# Patient Record
Sex: FEMALE | Race: WHITE
[De-identification: names, ages, dates, MRNs, and addresses within clinical notes are randomized per-mention and may not be internally consistent; named-entity substitution may affect disease eponyms.]

---

## 2017-06-26 ENCOUNTER — HOSPITAL ENCOUNTER (EMERGENCY)
Dept: HOSPITAL 62 - ER | Age: 22
Discharge: HOME | End: 2017-06-26
Payer: COMMERCIAL

## 2017-06-26 VITALS — SYSTOLIC BLOOD PRESSURE: 129 MMHG | DIASTOLIC BLOOD PRESSURE: 72 MMHG

## 2017-06-26 DIAGNOSIS — Z79.899: ICD-10-CM

## 2017-06-26 DIAGNOSIS — R56.9: Primary | ICD-10-CM

## 2017-06-26 LAB
ALBUMIN SERPL-MCNC: 4.4 G/DL (ref 3.5–5)
ALP SERPL-CCNC: 84 U/L (ref 38–126)
ALT SERPL-CCNC: 25 U/L (ref 9–52)
ANION GAP SERPL CALC-SCNC: 14 MMOL/L (ref 5–19)
APPEARANCE UR: CLEAR
AST SERPL-CCNC: 20 U/L (ref 14–36)
BARBITURATES UR QL SCN: NEGATIVE
BASOPHILS # BLD AUTO: 0 10^3/UL (ref 0–0.2)
BASOPHILS NFR BLD AUTO: 0.4 % (ref 0–2)
BILIRUB DIRECT SERPL-MCNC: 0.3 MG/DL (ref 0–0.4)
BILIRUB SERPL-MCNC: 0.4 MG/DL (ref 0.2–1.3)
BILIRUB UR QL STRIP: NEGATIVE
BUN SERPL-MCNC: 11 MG/DL (ref 7–20)
CALCIUM: 9.6 MG/DL (ref 8.4–10.2)
CHLORIDE SERPL-SCNC: 103 MMOL/L (ref 98–107)
CO2 SERPL-SCNC: 23 MMOL/L (ref 22–30)
CREAT SERPL-MCNC: 0.73 MG/DL (ref 0.52–1.25)
EOSINOPHIL # BLD AUTO: 0 10^3/UL (ref 0–0.6)
EOSINOPHIL NFR BLD AUTO: 0 % (ref 0–6)
ERYTHROCYTE [DISTWIDTH] IN BLOOD BY AUTOMATED COUNT: 13 % (ref 11.5–14)
ETHANOL SERPL-MCNC: < 10 MG/DL
GLUCOSE SERPL-MCNC: 83 MG/DL (ref 75–110)
GLUCOSE UR STRIP-MCNC: NEGATIVE MG/DL
HCT VFR BLD CALC: 41.8 % (ref 36–47)
HGB BLD-MCNC: 13.8 G/DL (ref 12–15.5)
HGB HCT DIFFERENCE: -0.4
KETONES UR STRIP-MCNC: NEGATIVE MG/DL
LYMPHOCYTES # BLD AUTO: 2.5 10^3/UL (ref 0.5–4.7)
LYMPHOCYTES NFR BLD AUTO: 30.2 % (ref 13–45)
MAGNESIUM SERPL-MCNC: 2.2 MG/DL (ref 1.6–2.3)
MCH RBC QN AUTO: 29.2 PG (ref 27–33.4)
MCHC RBC AUTO-ENTMCNC: 32.9 G/DL (ref 32–36)
MCV RBC AUTO: 89 FL (ref 80–97)
METHADONE UR QL SCN: NEGATIVE
MONOCYTES # BLD AUTO: 0.8 10^3/UL (ref 0.1–1.4)
MONOCYTES NFR BLD AUTO: 9 % (ref 3–13)
NEUTROPHILS # BLD AUTO: 5.1 10^3/UL (ref 1.7–8.2)
NEUTS SEG NFR BLD AUTO: 60.4 % (ref 42–78)
NITRITE UR QL STRIP: NEGATIVE
PCP UR QL SCN: NEGATIVE
PH UR STRIP: 8 [PH] (ref 5–9)
POTASSIUM SERPL-SCNC: 4.1 MMOL/L (ref 3.6–5)
PROT SERPL-MCNC: 8.2 G/DL (ref 6.3–8.2)
PROT UR STRIP-MCNC: NEGATIVE MG/DL
RBC # BLD AUTO: 4.73 10^6/UL (ref 3.72–5.28)
SODIUM SERPL-SCNC: 139.6 MMOL/L (ref 137–145)
SP GR UR STRIP: 1
URINE OPIATES LOW: NEGATIVE
UROBILINOGEN UR-MCNC: NEGATIVE MG/DL (ref ?–2)
WBC # BLD AUTO: 8.4 10^3/UL (ref 4–10.5)

## 2017-06-26 PROCEDURE — 80307 DRUG TEST PRSMV CHEM ANLYZR: CPT

## 2017-06-26 PROCEDURE — 80175 DRUG SCREEN QUAN LAMOTRIGINE: CPT

## 2017-06-26 PROCEDURE — 85025 COMPLETE CBC W/AUTO DIFF WBC: CPT

## 2017-06-26 PROCEDURE — 83735 ASSAY OF MAGNESIUM: CPT

## 2017-06-26 PROCEDURE — 81001 URINALYSIS AUTO W/SCOPE: CPT

## 2017-06-26 PROCEDURE — 99284 EMERGENCY DEPT VISIT MOD MDM: CPT

## 2017-06-26 PROCEDURE — 36415 COLL VENOUS BLD VENIPUNCTURE: CPT

## 2017-06-26 PROCEDURE — 80053 COMPREHEN METABOLIC PANEL: CPT

## 2017-06-26 PROCEDURE — 84703 CHORIONIC GONADOTROPIN ASSAY: CPT

## 2017-06-26 NOTE — ER DOCUMENT REPORT
ED Seizure





- General


Mode of Arrival: Ambulatory


Information source: Patient





- HPI


Patient complains to provider of: History of seizures


Current seizure medications: Lamictal


Preceding symptoms/context: Changed meds or dosage


Character of seizure: Focal shaking


Associated Symptoms: Other - see notes above





<YAYA GRIGSBY - Last Filed: 06/26/17 21:59>





<CARMINECARTER ANAHI - Last Filed: 06/26/17 23:40>





- General


Chief Complaint: Probable Seizure


Stated Complaint: POSSIBLE SEIZURE


Time Seen by Provider: 06/26/17 18:59


Notes: 


22 year old female with history of seizures presents to the ED complaining of 

having a seizure earlier this evening. Patient reports that she had a large 

coffee this morning before work because she was tired and another large coffee 

while at work. On the way home patient developed a headache and laid down in 

bed.  Patient's boyfriend reports that he went to the room to say hello and the 

patient suddenly started twitching and reports that her torso was convulsing.  

The boyfriend reports that the patient's hands are not shaking during this 

episode.  The patient states that the last thing she remembers was her 

boyfriend coming into the room and saying hello and does not remove the 

episode.  Patient reports that her Lamictal was prescribed by her psychiatrist 

3 weeks ago and has been increasing the dose by one pill every week for the 

past 3 weeks.  She reports that 3 days ago she went from taking 2 pills to 3 

pills.  She reports to feeling "normal" the 2 days following this change.  

Patient has a referral to a neurologist, but has not scheduled an appointment 

because her medication appears to be working. (YAYA GRIGSBY)





- Related Data


Allergies/Adverse Reactions: 


 





Penicillins Allergy (Verified 06/23/16 09:46)


 











Past Medical History





- General


Information source: Patient





- Social History


Smoking Status: Never Smoker


Chew tobacco use (# tins/day): No


Frequency of alcohol use: Rare


Drug Abuse: None


Family History: None


Patient has suicidal ideation: No


Patient has homicidal ideation: No


Neurological Medical History: Reports: Hx Seizures


Renal/ Medical History: Denies: Hx Peritoneal Dialysis





<YAYA GRIGSBY - Last Filed: 06/26/17 21:59>





Review of Systems





- Review of Systems


Constitutional: No symptoms reported


EENT: No symptoms reported


Cardiovascular: No symptoms reported


Respiratory: No symptoms reported


Gastrointestinal: No symptoms reported


Genitourinary: No symptoms reported


Female Genitourinary: No symptoms reported


Musculoskeletal: No symptoms reported


Skin: No symptoms reported


Hematologic/Lymphatic: No symptoms reported


Neurological/Psychological: See HPI, Seizure


-: Yes All other systems reviewed and negative





<YAYA GRIGSBY - Last Filed: 06/26/17 21:59>





Physical Exam





- Vital signs


Interpretation: Normal





- General


General appearance: Appears well, Alert





- HEENT


Head: Normocephalic, Atraumatic


Eyes: Normal


Pupils: PERRL





- Respiratory


Respiratory status: No respiratory distress


Chest status: Nontender


Breath sounds: Normal


Chest palpation: Normal





- Cardiovascular


Rhythm: Regular


Heart sounds: Normal auscultation


Murmur: No





- Abdominal


Inspection: Normal


Distension: No distension


Bowel sounds: Normal


Tenderness: Nontender


Organomegaly: No organomegaly





- Back


Back: Normal, Nontender





- Extremities


General upper extremity: Normal inspection, Nontender, Normal color, Normal ROM

, Normal temperature


General lower extremity: Normal inspection, Nontender, Normal color, Normal ROM

, Normal temperature, Normal weight bearing.  No: Kisha's sign





- Neurological


Neuro grossly intact: Yes


Cognition: Normal


Orientation: AAOx4


[place] Coma Scale Eye Opening: Spontaneous


[place] Coma Scale Verbal: Oriented


[place] Coma Scale Motor: Obeys Commands


[place] Coma Scale Total: 15


Speech: Normal


Motor strength normal: LUE, RUE, LLE, RLE


Sensory: Normal





- Psychological


Associated symptoms: Normal affect, Normal mood





- Skin


Skin Temperature: Warm


Skin Moisture: Dry


Skin Color: Normal





<CARTER LOU - Last Filed: 06/26/17 23:40>





- Vital signs


Vitals: 


 











Resp


 


 12 


 


 06/26/17 18:57














Course





- Laboratory


Result Diagrams: 


 06/26/17 19:15





 06/26/17 19:57





<YAYA GRIGSBY - Last Filed: 06/26/17 21:59>





- Laboratory


Result Diagrams: 


 06/26/17 19:15





 06/26/17 19:57





<CARTER LOU - Last Filed: 06/26/17 23:40>





- Re-evaluation


Re-evalutation: 





06/26/17 23:00


Patient with no further seizure activity in the emergency department.  Patient 

just started taking an increased dose of Lamictal 2 days ago.  She is 

instructed to go back to the lower dose.  No acute findings on blood work.  No 

evidence today for imaging.  Patient is scheduled to follow-up with the 

neurologist.  Stable for discharge.  She can call for the results of the 

Lamictal level.  Agrees with plan.  Stable for discharge.


 (CARTER LOU)





- Vital Signs


Vital signs: 


 











Temp Pulse Resp BP Pulse Ox


 


 99.0 F   93   18   129/72 H  100 


 


 06/26/17 23:08  06/26/17 23:08  06/26/17 23:08  06/26/17 23:08  06/26/17 23:08














Discharge





<YAYA GRIGSBY - Last Filed: 06/26/17 21:59>





<CARTER LOU - Last Filed: 06/26/17 23:40>





- Discharge


Clinical Impression: 


 Seizure





Condition: Stable


Disposition: HOME, SELF-CARE


Instructions:  Seizure, Known Epileptic ([facility])


Additional Instructions: 


Please go back to taking 2 pills of the Lamictal for now.


You can call for results at [phone].


Please see a neurologist as soon as you are able.


Forms:  Return to Work


Referrals: 


ANASTACIO DILLON MD [ACTIVE STAFF] - Follow up as needed


Scribe Attestation: 





06/26/17 23:40


I personally performed the services described in the documentation, reviewed 

and edited the documentation which was dictated to the scribe in my presence, 

and it accurately records my words and actions. (CARTER LOU)





Scribe Documentation





- Scribe


Written by Joycee:: Ayesha Mckeon, 06/26/2017 2212


acting as scribe for :: Carmine





<YAYA GRIGSBY - Last Filed: 06/26/17 21:59>

## 2017-07-19 ENCOUNTER — HOSPITAL ENCOUNTER (OUTPATIENT)
Dept: HOSPITAL 62 - RAD | Age: 22
End: 2017-07-19
Attending: PSYCHIATRY & NEUROLOGY
Payer: COMMERCIAL

## 2017-07-19 DIAGNOSIS — R56.9: Primary | ICD-10-CM

## 2017-07-19 PROCEDURE — A9577 INJ MULTIHANCE: HCPCS

## 2017-07-19 PROCEDURE — 70553 MRI BRAIN STEM W/O & W/DYE: CPT

## 2017-07-19 NOTE — RADIOLOGY REPORT (SQ)
EXAM DESCRIPTION:  MRI HEAD COMBO



COMPLETED DATE/TIME:  7/19/2017 5:24 pm



REASON FOR STUDY:  SEIZURES R56.9  UNSPECIFIED CONVULSIONS



COMPARISON:  None.



TECHNIQUE:  Multiplanar imaging includes noncontrasted T1, T2, FLAIR, diffusion with ADC map and post
gadolinium contrast T1 sequences. Images stored on PACS.



CONTRAST TYPE AND DOSE:  20 mL Multihance.



RENAL FUNCTION:  None required. The patient is less than 50 years old.



LIMITATIONS:  None.



FINDINGS:  ANATOMY: No anomalies. Normal vascular flow voids. Pituitary fossa normal.

CSF SPACES: Normal in size and contour. No hemorrhage.

CEREBRUM: Sulci and gyri normal in size and contour. Normal white matter signal on FLAIR imaging. No 
evidence of hemorrhage, mass, or extraaxial fluid collection. No abnormal enhancement post contrast.

POSTERIOR FOSSA: No signal alteration. No hemorrhage. No edema, masses, or mass effect. Internal vivek
tory canals, cerebellopontine angles, mastoids normal. No enhancing lesions. No abnormal enhancement 
post contrast.

DIFFUSION IMAGING: Negative for acute or subacute infarction.

ORBITS: No masses. Globes normal.

PARANASAL SINUSES: No fluid levels. Mucosa normal.

OTHER: No other significant finding.



IMPRESSION:  NORMAL MRI OF THE BRAIN WITHOUT AND WITH INTRAVENOUS GADOLINIUM CONTRAST.

EVIDENCE OF ACUTE STROKE: NO.



TECHNICAL DOCUMENTATION:  JOB ID:  [id number]

 2011 [employer]- All Rights Reserved

## 2018-05-23 ENCOUNTER — HOSPITAL ENCOUNTER (EMERGENCY)
Dept: HOSPITAL 62 - ER | Age: 23
Discharge: HOME | End: 2018-05-23
Payer: COMMERCIAL

## 2018-05-23 VITALS — SYSTOLIC BLOOD PRESSURE: 128 MMHG | DIASTOLIC BLOOD PRESSURE: 88 MMHG

## 2018-05-23 DIAGNOSIS — Z87.891: ICD-10-CM

## 2018-05-23 DIAGNOSIS — B96.89: ICD-10-CM

## 2018-05-23 DIAGNOSIS — N76.0: Primary | ICD-10-CM

## 2018-05-23 DIAGNOSIS — R10.30: ICD-10-CM

## 2018-05-23 DIAGNOSIS — R35.0: ICD-10-CM

## 2018-05-23 LAB
ADD MANUAL DIFF: NO
ANION GAP SERPL CALC-SCNC: 8 MMOL/L (ref 5–19)
APPEARANCE UR: (no result)
APTT PPP: YELLOW S
BACTERIA (WET MOUNT): (no result)
BASOPHILS # BLD AUTO: 0.1 10^3/UL (ref 0–0.2)
BASOPHILS NFR BLD AUTO: 0.8 % (ref 0–2)
BILIRUB UR QL STRIP: NEGATIVE
BUN SERPL-MCNC: 17 MG/DL (ref 7–20)
CALCIUM: 8.8 MG/DL (ref 8.4–10.2)
CHLORIDE SERPL-SCNC: 106 MMOL/L (ref 98–107)
CO2 SERPL-SCNC: 28 MMOL/L (ref 22–30)
EOSINOPHIL # BLD AUTO: 0 10^3/UL (ref 0–0.6)
EOSINOPHIL NFR BLD AUTO: 0.1 % (ref 0–6)
EPITHELIALS (WET MOUNT): (no result)
ERYTHROCYTE [DISTWIDTH] IN BLOOD BY AUTOMATED COUNT: 13.7 % (ref 11.5–14)
GLUCOSE SERPL-MCNC: 87 MG/DL (ref 75–110)
GLUCOSE UR STRIP-MCNC: NEGATIVE MG/DL
HCT VFR BLD CALC: 39.9 % (ref 36–47)
HGB BLD-MCNC: 13.4 G/DL (ref 12–15.5)
KETONES UR STRIP-MCNC: NEGATIVE MG/DL
LYMPHOCYTES # BLD AUTO: 3.3 10^3/UL (ref 0.5–4.7)
LYMPHOCYTES NFR BLD AUTO: 38.9 % (ref 13–45)
MCH RBC QN AUTO: 29.5 PG (ref 27–33.4)
MCHC RBC AUTO-ENTMCNC: 33.5 G/DL (ref 32–36)
MCV RBC AUTO: 88 FL (ref 80–97)
MONOCYTES # BLD AUTO: 0.9 10^3/UL (ref 0.1–1.4)
MONOCYTES NFR BLD AUTO: 10.2 % (ref 3–13)
NEUTROPHILS # BLD AUTO: 4.2 10^3/UL (ref 1.7–8.2)
NEUTS SEG NFR BLD AUTO: 50 % (ref 42–78)
NITRITE UR QL STRIP: NEGATIVE
PH UR STRIP: 5 [PH] (ref 5–9)
PLATELET # BLD: 374 10^3/UL (ref 150–450)
POTASSIUM SERPL-SCNC: 3.9 MMOL/L (ref 3.6–5)
PROT UR STRIP-MCNC: NEGATIVE MG/DL
RBC # BLD AUTO: 4.55 10^6/UL (ref 3.72–5.28)
SODIUM SERPL-SCNC: 142.1 MMOL/L (ref 137–145)
SP GR UR STRIP: 1.03
T.VAGINALIS (WET MOUNT): (no result)
TOTAL CELLS COUNTED % (AUTO): 100 %
UROBILINOGEN UR-MCNC: NEGATIVE MG/DL (ref ?–2)
WBC # BLD AUTO: 8.4 10^3/UL (ref 4–10.5)
WBCS (WET MOUNT): (no result)
YEAST (WET MOUNT): (no result)

## 2018-05-23 PROCEDURE — 87210 SMEAR WET MOUNT SALINE/INK: CPT

## 2018-05-23 PROCEDURE — 85025 COMPLETE CBC W/AUTO DIFF WBC: CPT

## 2018-05-23 PROCEDURE — 80048 BASIC METABOLIC PNL TOTAL CA: CPT

## 2018-05-23 PROCEDURE — 81025 URINE PREGNANCY TEST: CPT

## 2018-05-23 PROCEDURE — 96376 TX/PRO/DX INJ SAME DRUG ADON: CPT

## 2018-05-23 PROCEDURE — 36415 COLL VENOUS BLD VENIPUNCTURE: CPT

## 2018-05-23 PROCEDURE — 99284 EMERGENCY DEPT VISIT MOD MDM: CPT

## 2018-05-23 PROCEDURE — 76830 TRANSVAGINAL US NON-OB: CPT

## 2018-05-23 PROCEDURE — 81001 URINALYSIS AUTO W/SCOPE: CPT

## 2018-05-23 PROCEDURE — 96375 TX/PRO/DX INJ NEW DRUG ADDON: CPT

## 2018-05-23 PROCEDURE — 96365 THER/PROPH/DIAG IV INF INIT: CPT

## 2018-05-23 NOTE — ER DOCUMENT REPORT
ED Medical Screen (RME)





- General


Chief Complaint: Abdominal Pain


Stated Complaint: VAGINAL BLEEDING/CRAMPING


Time Seen by Provider: 05/23/18 18:05


Notes: 





RAPID MEDICAL EVALUATION DISCLOSURE


I have seen this patient as part of a Rapid Medical Evaluation and, if 

applicable, placed any initially appropriate orders. The patient will be seen 

and fully evaluated, including a full history and physical exam, by a provider (

in Main ED or Fast Track) when a room becomes available.





------------------------------------------------------------------





23-year-old female here with complaints of lower abdominal pain that started 

yesterday evening.  Pain is worse with slouching and improves with stretching 

out her stomach.  She has also had some urinary frequency but no dysuria fevers 

chills vomiting nausea diarrhea.  Two weeks ago, she did have some vaginal 

bleeding that lasted about a week but this resolved.





EXAM


Minimal right lower quadrant TTP


Moderate suprapubic TTP


TRAVEL OUTSIDE OF THE [place] IN LAST 30 DAYS: No





- Related Data


Allergies/Adverse Reactions: 


 





Penicillins Allergy (Verified 05/23/18 17:36)


 











Past Medical History





- Social History


Chew tobacco use (# tins/day): No


Frequency of alcohol use: None


Drug Abuse: None


Neurological Medical History: Reports: Hx Seizures


Renal/ Medical History: Denies: Hx Peritoneal Dialysis





Physical Exam





- Vital signs


Vitals: 





 











Temp Pulse Resp BP Pulse Ox


 


 99.3 F   95   18   134/71 H  100 


 


 05/23/18 17:45  05/23/18 17:45  05/23/18 17:45  05/23/18 17:45  05/23/18 17:45














Course





- Vital Signs


Vital signs: 





 











Temp Pulse Resp BP Pulse Ox


 


 99.3 F   95   18   134/71 H  100 


 


 05/23/18 17:45  05/23/18 17:45  05/23/18 17:45  05/23/18 17:45  05/23/18 17:45

## 2018-05-23 NOTE — ER DOCUMENT REPORT
ED General





- General


Chief Complaint: Abdominal Pain


Stated Complaint: VAGINAL BLEEDING/CRAMPING


Time Seen by Provider: 05/23/18 18:05


Mode of Arrival: Ambulatory


Information source: Patient


TRAVEL OUTSIDE OF THE [place] IN LAST 30 DAYS: No





- HPI


Notes: 





23-year-old female presents emergency department for evaluation of vaginal 

bleeding and lower abdominal cramping.  Patient reports that she had bleeding 

approximately a week ago but has since resolved.  She reports that she has 

small amount of vaginal discharge.  She reports that she has urinary frequency 

but no dysuria.  Patient reports that she is on birth control at this time.  

Patient reports that she has no concern for STD.  Patient reports when she lays 

backwards her pain feels better.  She denies any fever, rash, chest pain, 

shortness of breath, abdominal pain, nausea, vomiting, diarrhea, or dysuria.





- Related Data


Allergies/Adverse Reactions: 


 





Penicillins Allergy (Verified 05/23/18 17:36)


 











Past Medical History





- General


Information source: Patient





- Social History


Smoking Status: Former Smoker


Chew tobacco use (# tins/day): No


Frequency of alcohol use: None


Drug Abuse: None


Family History: None


Patient has suicidal ideation: No


Patient has homicidal ideation: No


Neurological Medical History: Reports: Hx Seizures


Renal/ Medical History: Denies: Hx Peritoneal Dialysis





Review of Systems





- Review of Systems


-: Yes All other systems reviewed and negative





Physical Exam





- Vital signs


Vitals: 


 











Temp Pulse Resp BP Pulse Ox


 


 99.3 F   95   18   134/71 H  100 


 


 05/23/18 17:45  05/23/18 17:45  05/23/18 17:45  05/23/18 17:45  05/23/18 17:45














- Notes


Notes: 





PHYSICAL EXAMINATION:





GENERAL: Well-appearing, well-nourished and in no acute distress.





HEAD: Atraumatic, normocephalic.





ABDOMEN: Soft, moderate suprapubic tenderness with no guarding, rigidity, 

rebound tenderness, or peritoneal signs. nondistended abdomen. No masses 

appreciated.





Female : No external lesions noted.  No blood or tissue in the canal.  

Moderate white discharge.  No adnexal tenderness.  No cervical motion 

tenderness.





Musculoskeletal: Normal range of motion, no pitting or edema.  No cyanosis.





NEUROLOGICAL: Cranial nerves grossly intact.  Normal speech, normal gait.  

Normal sensory, motor exams





PSYCH: Normal mood, normal affect.





SKIN: Warm, Dry, normal turgor, no rashes or lesions noted.











Course





- Re-evaluation


Re-evalutation: 





05/23/18 21:46


Patient presented to the emergency department for evaluation of lower abdominal 

pain and vaginal bleeding.  Patient was nontoxic or septic appearing in no 

acute or respiratory distress.  Patient was afebrile not hypoxic.  Consistent 

with evidence of PID, appendicitis, cholecystitis, pancreatitis, or acute 

surgical abdomen.  See wet prep.  Ultrasound was negative for acute process.  

The likelihood of other entities in the differential is insufficient to justify 

any further testing for them.  I discussed care plan at length with patient.  

Any and all questions were answered.  Patient was given Toradol and IV fluid.  

On reassessment abdomen was soft and nontender.  Discharged home with naproxen 

and Flagyl.  Advised patient to follow-up with OB/GYN and take medications as 

instructed.  I also advised her to return immediately to the emergency 

department for any new, worsening, or concerning symptoms as discussed.  She 

understands and agrees with plan.





- Vital Signs


Vital signs: 


 











Temp Pulse Resp BP Pulse Ox


 


 99.3 F   95   18   134/71 H  100 


 


 05/23/18 17:45  05/23/18 17:45  05/23/18 17:45  05/23/18 17:45  05/23/18 17:45














- Laboratory


Result Diagrams: 


 05/23/18 20:00





 05/23/18 20:00


Laboratory results interpreted by me: 


 











  05/23/18





  18:13


 


Urine Ascorbic Acid  40 H














Discharge





- Discharge


Clinical Impression: 


 Bacterial vaginosis





Instructions:  Vaginosis, Bacterial ([facility])


Additional Instructions: 


Follow-up with OB/GYN and take medications as instructed.  Return immediately 

to the emergency department for any new, worsening, or concerning symptoms as 

discussed.


Prescriptions: 


Metronidazole [Flagyl 500 mg Tablet] 500 mg PO BID #14 tablet


Naproxen 500 mg PO Q12H #20 tablet


Referrals: 


CARRIE DIMAS MD [ACTIVE STAFF] - Follow up as needed

## 2018-05-23 NOTE — RADIOLOGY REPORT (SQ)
EXAM DESCRIPTION:  U/S NON-OB PELVIS TV W/O DOP



COMPLETED DATE/TIME:  5/23/2018 8:56 pm



REASON FOR STUDY:  Vaginal bleeding



COMPARISON:  None.



TECHNIQUE:  Dynamic and static grayscale images acquired of the pelvis via transvaginal approach and 
recorded on PACS. Additional selected color Doppler and spectral images recorded.



LIMITATIONS:  None.



FINDINGS:  UTERUS: Contour normal.  No mass.

ENDOMETRIAL STRIPE: No focal or generalized thickening. No masses.

CERVIX: No nabothian cysts.

RIGHT OVARY AND DOPPLER: Ovary not visualized.

LEFT OVARY AND DOPPLER: Ovary not visualized.

FREE FLUID: None noted.

OTHER: No other significant finding.

MEASUREMENTS:

UTERUS: 2.5 x 4.3 x 6.4 cm.

ENDOMETRIAL STRIPE: 2 mm.

RIGHT OVARY: Not visualized.

LEFT OVARY: Not visualized.



IMPRESSION:  OVARIES NOT VISUALIZED.  OTHERWISE UNREMARKABLE TRANSVAGINAL PELVIC ULTRASOUND.  NO ABNO
RMAL FINDINGS IN THE UTERUS.



TECHNICAL DOCUMENTATION:  JOB ID:  [id number]

 2011 [employer]- All Rights Reserved                          Rev-5/18



Reading location - IP/workstation name: [email or web address]

## 2019-06-14 ENCOUNTER — HOSPITAL ENCOUNTER (OUTPATIENT)
Dept: HOSPITAL 62 - OD | Age: 24
End: 2019-06-14
Attending: OBSTETRICS & GYNECOLOGY
Payer: COMMERCIAL

## 2019-06-14 DIAGNOSIS — N97.9: Primary | ICD-10-CM

## 2019-06-14 PROCEDURE — 36415 COLL VENOUS BLD VENIPUNCTURE: CPT

## 2019-06-14 PROCEDURE — 84144 ASSAY OF PROGESTERONE: CPT

## 2019-06-14 PROCEDURE — 84146 ASSAY OF PROLACTIN: CPT

## 2019-07-02 ENCOUNTER — HOSPITAL ENCOUNTER (OUTPATIENT)
Dept: HOSPITAL 62 - RAD | Age: 24
End: 2019-07-02
Attending: OBSTETRICS & GYNECOLOGY
Payer: COMMERCIAL

## 2019-07-02 DIAGNOSIS — N97.9: Primary | ICD-10-CM

## 2019-07-02 PROCEDURE — 74740 X-RAY FEMALE GENITAL TRACT: CPT

## 2019-07-02 PROCEDURE — 58340 CATHETER FOR HYSTEROGRAPHY: CPT

## 2019-07-02 NOTE — RADIOLOGY REPORT (SQ)
EXAM DESCRIPTION:  HYSTEROSALPINGOGRAM; HYSTERO CATH/INJECTION



COMPLETED DATE/TIME:  7/2/2019 4:01 pm



REASON FOR STUDY:  (N97.9)FEMALE INFERTILITY, UNSPECIFIED; FEMALE INFERTITLITY N97.9  FEMALE INFERTIL
ITY, UNSPECIFIED



COMPARISON:  Pelvic ultrasound 5/23/2018



PROCEDURE:  PRE-PROCEDURE: Procedure was explained to the patient. She was told to expect cramping du
ring the procedure, and possible spotting post procedure.

PROCEDURE: The cervix was prepped in sterile fashion.  Under direct visual inspection, the cervix was
 cannulated with the hysterosalpingogram catheter and contrast injected.



TECHNIQUE:  Temporal fluoroscopic images acquired during the procedure stored to PACS.



FLUOROSCOPY TIME:  Less than 5 seconds

22 digital radiographic images saved to PACS.



LIMITATIONS:  None.



FINDINGS:  UTERUS: No identified anomalies.  No synechia.  Normal size and configuration in the endom
etrial canal.

RIGHT ADNEXA: Normal size fallopian tube.  Free spill of contrast into the peritoneal cavity.

LEFT ADNEXA: Normal size fallopian tube.  However, there was no free spillage of contrast around the 
left ovary.  It is difficult to discern whether this is due to adhesions along the left ovary, or pre
ferential flow of contrast out the right fallopian tube.

POST PROCEDURE: The patient tolerated the procedure with no adverse effects.



IMPRESSION:  Normal opacification of the endometrial canal and right fallopian tube, with free spilla
ge out the right fallopian tube.

Normal size left fallopian tube.  However, there was no free spillage around the left ovary.

It is difficult to discern from the study whether this is due to adhesions from endometriosis along t
he distal left fallopian tube, or preferential flow out the right fallopian tube.



COMMENT:  Study performed by and interpreted by the radiologist.

Study performed by OB/GYN physician.  Supervision and interpretation by the radiologist.

Quality :  Final reports for procedures using fluoroscopy that document radiation exposure rani
rehana, or exposure time and number of fluorographic images (if radiation exposure indices are not avail
able)



TECHNICAL DOCUMENTATION:  JOB ID:  [id number]

 2011 [employer]- All Rights Reserved



Reading location - IP/workstation name: [email or web address]

## 2019-07-02 NOTE — RADIOLOGY REPORT (SQ)
EXAM DESCRIPTION:  HYSTEROSALPINGOGRAM; HYSTERO CATH/INJECTION



COMPLETED DATE/TIME:  7/2/2019 4:01 pm



REASON FOR STUDY:  (N97.9)FEMALE INFERTILITY, UNSPECIFIED; FEMALE INFERTITLITY N97.9  FEMALE INFERTIL
ITY, UNSPECIFIED



COMPARISON:  Pelvic ultrasound 5/23/2018



PROCEDURE:  PRE-PROCEDURE: Procedure was explained to the patient. She was told to expect cramping du
ring the procedure, and possible spotting post procedure.

PROCEDURE: The cervix was prepped in sterile fashion.  Under direct visual inspection, the cervix was
 cannulated with the hysterosalpingogram catheter and contrast injected.



TECHNIQUE:  Temporal fluoroscopic images acquired during the procedure stored to PACS.



FLUOROSCOPY TIME:  Less than 5 seconds

22 digital radiographic images saved to PACS.



LIMITATIONS:  None.



FINDINGS:  UTERUS: No identified anomalies.  No synechia.  Normal size and configuration in the endom
etrial canal.

RIGHT ADNEXA: Normal size fallopian tube.  Free spill of contrast into the peritoneal cavity.

LEFT ADNEXA: Normal size fallopian tube.  However, there was no free spillage of contrast around the 
left ovary.  It is difficult to discern whether this is due to adhesions along the left ovary, or pre
ferential flow of contrast out the right fallopian tube.

POST PROCEDURE: The patient tolerated the procedure with no adverse effects.



IMPRESSION:  Normal opacification of the endometrial canal and right fallopian tube, with free spilla
ge out the right fallopian tube.

Normal size left fallopian tube.  However, there was no free spillage around the left ovary.

It is difficult to discern from the study whether this is due to adhesions from endometriosis along t
he distal left fallopian tube, or preferential flow out the right fallopian tube.



COMMENT:  Study performed by and interpreted by the radiologist.

Study performed by OB/GYN physician.  Supervision and interpretation by the radiologist.

Quality :  Final reports for procedures using fluoroscopy that document radiation exposure rani
rehana, or exposure time and number of fluorographic images (if radiation exposure indices are not avail
able)



TECHNICAL DOCUMENTATION:  JOB ID:  [id number]

 2011 [employer]- All Rights Reserved



Reading location - IP/workstation name: [email or web address]

## 2019-08-18 ENCOUNTER — HOSPITAL ENCOUNTER (EMERGENCY)
Dept: HOSPITAL 62 - ER | Age: 24
Discharge: HOME | End: 2019-08-18
Payer: COMMERCIAL

## 2019-08-18 VITALS — SYSTOLIC BLOOD PRESSURE: 125 MMHG | DIASTOLIC BLOOD PRESSURE: 62 MMHG

## 2019-08-18 DIAGNOSIS — R10.2: Primary | ICD-10-CM

## 2019-08-18 LAB
ADD MANUAL DIFF: NO
ANION GAP SERPL CALC-SCNC: 10 MMOL/L (ref 5–19)
APPEARANCE UR: (no result)
APTT PPP: YELLOW S
BASOPHILS # BLD AUTO: 0 10^3/UL (ref 0–0.2)
BASOPHILS NFR BLD AUTO: 0.3 % (ref 0–2)
BILIRUB UR QL STRIP: NEGATIVE
BUN SERPL-MCNC: 13 MG/DL (ref 7–20)
CALCIUM: 9.5 MG/DL (ref 8.4–10.2)
CHLORIDE SERPL-SCNC: 102 MMOL/L (ref 98–107)
CO2 SERPL-SCNC: 27 MMOL/L (ref 22–30)
EOSINOPHIL # BLD AUTO: 0 10^3/UL (ref 0–0.6)
EOSINOPHIL NFR BLD AUTO: 0.1 % (ref 0–6)
ERYTHROCYTE [DISTWIDTH] IN BLOOD BY AUTOMATED COUNT: 13.3 % (ref 11.5–14)
GLUCOSE SERPL-MCNC: 86 MG/DL (ref 75–110)
GLUCOSE UR STRIP-MCNC: NEGATIVE MG/DL
HCT VFR BLD CALC: 42.8 % (ref 36–47)
HGB BLD-MCNC: 14.6 G/DL (ref 12–15.5)
KETONES UR STRIP-MCNC: NEGATIVE MG/DL
LYMPHOCYTES # BLD AUTO: 2.7 10^3/UL (ref 0.5–4.7)
LYMPHOCYTES NFR BLD AUTO: 33.9 % (ref 13–45)
MCH RBC QN AUTO: 30.4 PG (ref 27–33.4)
MCHC RBC AUTO-ENTMCNC: 34.1 G/DL (ref 32–36)
MCV RBC AUTO: 89 FL (ref 80–97)
MONOCYTES # BLD AUTO: 0.7 10^3/UL (ref 0.1–1.4)
MONOCYTES NFR BLD AUTO: 9.1 % (ref 3–13)
NEUTROPHILS # BLD AUTO: 4.5 10^3/UL (ref 1.7–8.2)
NEUTS SEG NFR BLD AUTO: 56.6 % (ref 42–78)
NITRITE UR QL STRIP: NEGATIVE
PH UR STRIP: 6 [PH] (ref 5–9)
PLATELET # BLD: 383 10^3/UL (ref 150–450)
POTASSIUM SERPL-SCNC: 4.3 MMOL/L (ref 3.6–5)
PROT UR STRIP-MCNC: NEGATIVE MG/DL
RBC # BLD AUTO: 4.8 10^6/UL (ref 3.72–5.28)
SP GR UR STRIP: 1.02
TOTAL CELLS COUNTED % (AUTO): 100 %
UROBILINOGEN UR-MCNC: NEGATIVE MG/DL (ref ?–2)
WBC # BLD AUTO: 7.9 10^3/UL (ref 4–10.5)

## 2019-08-18 PROCEDURE — 76830 TRANSVAGINAL US NON-OB: CPT

## 2019-08-18 PROCEDURE — 93976 VASCULAR STUDY: CPT

## 2019-08-18 PROCEDURE — 81025 URINE PREGNANCY TEST: CPT

## 2019-08-18 PROCEDURE — 36415 COLL VENOUS BLD VENIPUNCTURE: CPT

## 2019-08-18 PROCEDURE — 96374 THER/PROPH/DIAG INJ IV PUSH: CPT

## 2019-08-18 PROCEDURE — 80048 BASIC METABOLIC PNL TOTAL CA: CPT

## 2019-08-18 PROCEDURE — 81001 URINALYSIS AUTO W/SCOPE: CPT

## 2019-08-18 PROCEDURE — 99284 EMERGENCY DEPT VISIT MOD MDM: CPT

## 2019-08-18 PROCEDURE — 85025 COMPLETE CBC W/AUTO DIFF WBC: CPT

## 2019-08-18 NOTE — ER DOCUMENT REPORT
ED General





- General


Chief Complaint: Pelvic Pain


Stated Complaint: PELVIC PAIN


Time Seen by Provider: 08/18/19 18:01


Primary Care Provider: 


NICK ANTHONY MD [Primary Care Provider] - Follow up in 3-5 days


Notes: 





Patient is a 24-year-old female with history of endometriosis that presents to 

the emergency department for chief complaint of pelvic pain.  Patient states she

is been having on and off pelvic pain for the past 2 weeks, is worse on the left

compared to the right, seem to be worse today to the point that it caught her 

off guard, she states it is worse with applying pressure to the area, and its a 

burning sensation that occasionally sharp.  She has had increased urination, but

denies having any dysuria or hematuria.  She denies having any flank pain, 

fevers, chills, night sweats, nausea, vomiting, chest pain or shortness of 

breath.  Denies having any headaches.  Denies any vaginal bleeding or discharge.

Patient states that her current pain is around a 4/10. 





Past Medical History: Endometriosis, epilepsy, anxiety


Past Surgical History: Denies surgical history


Social History: Admits to rare alcohol use, denies tobacco or illicit drug use.


Family History: Reviewed and noncontributory for presenting illness


Allergies: Reviewed, see documented allergy list. 





REVIEW OF SYSTEMS:


Other than noted above, the 12 point review of systems was reviewed with the 

patient and were negative, all pertinent findings are included in the HPI.





PHYSICAL EXAMINATION:





Vital signs reviewed, nursing noted reviewed. 





GENERAL: Well-appearing, well-nourished and in no acute distress.





HEAD: Atraumatic, normocephalic.





EYES: Eyes appear normal, extraocular movements intact, sclera anicteric, 

conjunctiva are normal.





ENT: nares patent, oropharynx clear without exudates.  Moist mucous membranes.





NECK: Normal range of motion, supple without lymphadenopathy





LUNGS: Breath sounds clear to auscultation bilaterally and equal.  No wheezes 

rales or rhonchi.





HEART: Regular rate and rhythm without murmurs





ABDOMEN: Soft, mild tenderness to palpation to the left lower abdomen, 

normoactive bowel sounds.  No rebound, guarding, or rigidity. No masses 

appreciated.





EXTREMITIES: Nontender, good range of motion, no pitting or edema.  





NEUROLOGICAL: No focal neurological deficits. Moves all extremities spontane

ously Motor and sensory grossly intact on exam.





PSYCH: Normal mood, normal affect.





SKIN: Warm, Dry, normal turgor, no rashes or lesions noted on exposed skin





TRAVEL OUTSIDE OF THE [place] IN LAST 30 DAYS: No





- Related Data


Allergies/Adverse Reactions: 


                                        





Penicillins Allergy (Verified 08/18/19 17:02)


   











Past Medical History





- Social History


Smoking Status: Never Smoker


Family History: None


Patient has suicidal ideation: No


Patient has homicidal ideation: No


Neurological Medical History: Reports: Hx Seizures


Renal/ Medical History: Denies: Hx Peritoneal Dialysis





Physical Exam





- Vital signs


Vitals: 


                                        











Temp Pulse Resp BP Pulse Ox


 


 99.1 F   95   18   149/94 H  100 


 


 08/18/19 17:07  08/18/19 17:07  08/18/19 17:07  08/18/19 17:07  08/18/19 17:07














Course





- Re-evaluation


Re-evalutation: 





Patient seen and examined, vital signs reviewed, patient appeared well on exam, 

did have some mild lower abdominal tenderness, but was otherwise unremarkable.  

Patient blood work was obtained, as well as transvaginal ultrasound and 

urinalysis, pregnancy testing, all of which were unremarkable, no signs of to

rsion on ultrasound, cyst, or other concerning findings.  Patient was treated 

with Toradol for her pain, patient is feeling improved afterwards, discussed 

with her that this may be endometriosis, and advised to follow-up with her 

OB/GYN, which she states she will follow-up with.  She is advised to take 

over-the-counter NSAIDs, and if her symptoms worsen to return to the emergency 

department.  Patient was agreeable with this plan of care and discharged home.








Laboratory











  08/18/19 08/18/19 08/18/19





  18:07 18:21 18:21


 


WBC   7.9 


 


RBC   4.80 


 


Hgb   14.6 


 


Hct   42.8 


 


MCV   89 


 


MCH   30.4 


 


MCHC   34.1 


 


RDW   13.3 


 


Plt Count   383 


 


Seg Neutrophils %   56.6 


 


Lymphocytes %   33.9 


 


Monocytes %   9.1 


 


Eosinophils %   0.1 


 


Basophils %   0.3 


 


Absolute Neutrophils   4.5 


 


Absolute Lymphocytes   2.7 


 


Absolute Monocytes   0.7 


 


Absolute Eosinophils   0.0 


 


Absolute Basophils   0.0 


 


Sodium    138.6


 


Potassium    4.3


 


Chloride    102


 


Carbon Dioxide    27


 


Anion Gap    10


 


BUN    13


 


Creatinine    0.69


 


Est GFR ( Amer)    > 60


 


Est GFR (Non-Af Amer)    > 60


 


Glucose    86


 


Calcium    9.5


 


Urine Color  YELLOW  


 


Urine Appearance  SLIGHTLY-CLOUDY  


 


Urine pH  6.0  


 


Ur Specific Gravity  1.021  


 


Urine Protein  NEGATIVE  


 


Urine Glucose (UA)  NEGATIVE  


 


Urine Ketones  NEGATIVE  


 


Urine Blood  NEGATIVE  


 


Urine Nitrite  NEGATIVE  


 


Urine Bilirubin  NEGATIVE  


 


Urine Urobilinogen  NEGATIVE  


 


Ur Leukocyte Esterase  NEGATIVE  


 


Urine WBC (Auto)  1  


 


Urine RBC (Auto)  2  


 


Urine Bacteria (Auto)  TRACE  


 


Squamous Epi Cells Auto  3  


 


Urine Mucus (Auto)  OCC  


 


Urine Ascorbic Acid  NEGATIVE  


 


Urine HCG, Qual  NEGATIVE  











                                        





Transvaginal US  08/18/19 18:10


IMPRESSION:  Nonvisualized ovaries.  Age-appropriate uterus.


 














- Vital Signs


Vital signs: 


                                        











Temp Pulse Resp BP Pulse Ox


 


 99.1 F   88   18   125/62   100 


 


 08/18/19 17:07  08/18/19 20:03  08/18/19 20:03  08/18/19 20:03  08/18/19 20:03














- Laboratory


Result Diagrams: 


                                 08/18/19 18:21





                                 08/18/19 18:21





Discharge





- Discharge


Clinical Impression: 


 Pelvic pain





Condition: Stable


Disposition: HOME, SELF-CARE


Instructions:  Pelvic Pain ([facility])


Additional Instructions: 


Please follow-up with your OB/GYN, if you have any worsening of your symptoms, 

the pain gets any worse, you develop severe vomiting, please return to the 

emergency department to be reevaluated.


Referrals: 


NICK ANTHONY MD [Primary Care Provider] - Follow up in 3-5 days

## 2019-08-18 NOTE — RADIOLOGY REPORT (SQ)
EXAM DESCRIPTION:  U/S NON OB PEL TV W/DOPPLER



COMPLETED DATE/TIME:  8/18/2019 6:47 pm



REASON FOR STUDY:  left pelvic pain



COMPARISON:  None.



TECHNIQUE:  Dynamic and static grayscale images acquired of the pelvis via transvaginal approach and 
recorded on PACS. Additional selected color Doppler and spectral images recorded.



LIMITATIONS:  None.



FINDINGS:  UTERUS: Contour normal.  No mass.

ENDOMETRIAL STRIPE: No focal or generalized thickening. No masses.

CERVIX: No nabothian cysts.

RIGHT OVARY AND DOPPLER: Ovary not visualized.

LEFT OVARY AND DOPPLER: Ovary not visualized.

FREE FLUID: None noted.

OTHER: No other significant finding.

MEASUREMENTS:

UTERUS: 7.4 x 4.5 x 3.0 cm

ENDOMETRIAL STRIPE: 4.5 mm

RIGHT OVARY: Not visualized.

LEFT OVARY: Not visualized.



IMPRESSION:  Nonvisualized ovaries.  Age-appropriate uterus.



TECHNICAL DOCUMENTATION:  JOB ID:  [id number]

[id number]

 2011 [email or web address]- All Rights Reserved                          Rev-[id number]



Reading location - IP/workstation name: [email or web address]

## 2019-09-24 ENCOUNTER — HOSPITAL ENCOUNTER (EMERGENCY)
Dept: HOSPITAL 62 - ER | Age: 24
Discharge: HOME | End: 2019-09-24
Payer: COMMERCIAL

## 2019-09-24 VITALS — SYSTOLIC BLOOD PRESSURE: 124 MMHG | DIASTOLIC BLOOD PRESSURE: 71 MMHG

## 2019-09-24 DIAGNOSIS — O99.351: ICD-10-CM

## 2019-09-24 DIAGNOSIS — G40.909: ICD-10-CM

## 2019-09-24 DIAGNOSIS — O26.891: Primary | ICD-10-CM

## 2019-09-24 DIAGNOSIS — R11.0: ICD-10-CM

## 2019-09-24 DIAGNOSIS — R10.2: ICD-10-CM

## 2019-09-24 DIAGNOSIS — Z79.899: ICD-10-CM

## 2019-09-24 DIAGNOSIS — Z88.0: ICD-10-CM

## 2019-09-24 DIAGNOSIS — R10.32: ICD-10-CM

## 2019-09-24 DIAGNOSIS — Z3A.01: ICD-10-CM

## 2019-09-24 LAB
ADD MANUAL DIFF: NO
ALBUMIN SERPL-MCNC: 4.4 G/DL (ref 3.5–5)
ALP SERPL-CCNC: 70 U/L (ref 38–126)
ANION GAP SERPL CALC-SCNC: 11 MMOL/L (ref 5–19)
APPEARANCE UR: (no result)
APTT PPP: YELLOW S
AST SERPL-CCNC: 37 U/L (ref 14–36)
BASOPHILS # BLD AUTO: 0.1 10^3/UL (ref 0–0.2)
BASOPHILS NFR BLD AUTO: 1.3 % (ref 0–2)
BILIRUB DIRECT SERPL-MCNC: 0.3 MG/DL (ref 0–0.4)
BILIRUB SERPL-MCNC: 0.5 MG/DL (ref 0.2–1.3)
BILIRUB UR QL STRIP: NEGATIVE
BUN SERPL-MCNC: 11 MG/DL (ref 7–20)
CALCIUM: 9.1 MG/DL (ref 8.4–10.2)
CHLORIDE SERPL-SCNC: 105 MMOL/L (ref 98–107)
CO2 SERPL-SCNC: 23 MMOL/L (ref 22–30)
EOSINOPHIL # BLD AUTO: 0 10^3/UL (ref 0–0.6)
EOSINOPHIL NFR BLD AUTO: 0.2 % (ref 0–6)
ERYTHROCYTE [DISTWIDTH] IN BLOOD BY AUTOMATED COUNT: 13.3 % (ref 11.5–14)
GLUCOSE SERPL-MCNC: 86 MG/DL (ref 75–110)
GLUCOSE UR STRIP-MCNC: NEGATIVE MG/DL
HCT VFR BLD CALC: 40.3 % (ref 36–47)
HGB BLD-MCNC: 13.6 G/DL (ref 12–15.5)
KETONES UR STRIP-MCNC: NEGATIVE MG/DL
LYMPHOCYTES # BLD AUTO: 3.5 10^3/UL (ref 0.5–4.7)
LYMPHOCYTES NFR BLD AUTO: 43.1 % (ref 13–45)
MCH RBC QN AUTO: 30.5 PG (ref 27–33.4)
MCHC RBC AUTO-ENTMCNC: 33.7 G/DL (ref 32–36)
MCV RBC AUTO: 91 FL (ref 80–97)
MONOCYTES # BLD AUTO: 0.8 10^3/UL (ref 0.1–1.4)
MONOCYTES NFR BLD AUTO: 10.5 % (ref 3–13)
NEUTROPHILS # BLD AUTO: 3.7 10^3/UL (ref 1.7–8.2)
NEUTS SEG NFR BLD AUTO: 44.9 % (ref 42–78)
NITRITE UR QL STRIP: NEGATIVE
PH UR STRIP: 6 [PH] (ref 5–9)
PLATELET # BLD: 342 10^3/UL (ref 150–450)
POTASSIUM SERPL-SCNC: 3.7 MMOL/L (ref 3.6–5)
PROT SERPL-MCNC: 7.9 G/DL (ref 6.3–8.2)
PROT UR STRIP-MCNC: 30 MG/DL
RBC # BLD AUTO: 4.44 10^6/UL (ref 3.72–5.28)
SP GR UR STRIP: 1.03
TOTAL CELLS COUNTED % (AUTO): 100 %
UROBILINOGEN UR-MCNC: NEGATIVE MG/DL (ref ?–2)
WBC # BLD AUTO: 8.1 10^3/UL (ref 4–10.5)

## 2019-09-24 PROCEDURE — 84702 CHORIONIC GONADOTROPIN TEST: CPT

## 2019-09-24 PROCEDURE — 99284 EMERGENCY DEPT VISIT MOD MDM: CPT

## 2019-09-24 PROCEDURE — 85025 COMPLETE CBC W/AUTO DIFF WBC: CPT

## 2019-09-24 PROCEDURE — 76817 TRANSVAGINAL US OBSTETRIC: CPT

## 2019-09-24 PROCEDURE — 96374 THER/PROPH/DIAG INJ IV PUSH: CPT

## 2019-09-24 PROCEDURE — 81025 URINE PREGNANCY TEST: CPT

## 2019-09-24 PROCEDURE — 81001 URINALYSIS AUTO W/SCOPE: CPT

## 2019-09-24 PROCEDURE — 87086 URINE CULTURE/COLONY COUNT: CPT

## 2019-09-24 PROCEDURE — 80053 COMPREHEN METABOLIC PANEL: CPT

## 2019-09-24 PROCEDURE — 36415 COLL VENOUS BLD VENIPUNCTURE: CPT

## 2019-09-24 PROCEDURE — 93976 VASCULAR STUDY: CPT

## 2019-09-24 NOTE — ER DOCUMENT REPORT
ED General





- General


Chief Complaint: Pelvic Pain


Stated Complaint: LEFT HIP PAIN


Time Seen by Provider: 09/24/19 01:36


Notes: 





Patient is a 24-year-old female presents to the emergency department for left 

pelvic and abdominal pain.  Patient states it is cramping in nature.  Patient 

states she is nauseated but is denying any vomiting or diarrhea.  She is denying

any fevers.  She is denying any dysuria or vaginal discharge to include bleeding

.  Patient states she took an at home pregnancy test which was positive.  States

she feels as though she is potentially 4 weeks pregnant. Patient states she was 

told by her OB/GYN that she had a "irregular fallopian tube."  States she is 

concerned about ectopic pregnancy which is why she presents to the emergency 

room.





Past medical history: Epilepsy, endometriosis


Medications: Vimpat, gabapentin, Ativan, folic acid


Allergies: Penicillin


TRAVEL OUTSIDE OF THE [place] IN LAST 30 DAYS: No





- Related Data


Allergies/Adverse Reactions: 


                                        





Penicillins Allergy (Verified 08/18/19 17:02)


   











Past Medical History





- General


Information source: Patient





- Social History


Smoking Status: Never Smoker


Family History: None


Patient has suicidal ideation: No


Patient has homicidal ideation: No


Neurological Medical History: Reports: Hx Seizures


Renal/ Medical History: Denies: Hx Peritoneal Dialysis





Review of Systems





- Review of Systems


Constitutional: denies: Fever


EENT: No symptoms reported


Cardiovascular: No symptoms reported


Respiratory: No symptoms reported


Gastrointestinal: See HPI


Genitourinary: No symptoms reported


Female Genitourinary: See HPI


Musculoskeletal: No symptoms reported


Skin: No symptoms reported


Hematologic/Lymphatic: No symptoms reported


Neurological/Psychological: No symptoms reported





Physical Exam





- Vital signs


Vitals: 





                                        











Temp Pulse Resp Pulse Ox


 


 99.1 F   94   20   94 


 


 09/24/19 01:13  09/24/19 01:13  09/24/19 01:13  09/24/19 01:13














- Notes


Notes: 





GENERAL: Obese, alert, interacts well. No acute distress.


HEAD: Normocephalic, atraumatic.


EYES: Pupils equal, round, and reactive to light. Extraocular movements intact.


ENT: Oral mucosa moist, tongue midline. 


NECK: Full range of motion. Supple. Trachea midline.


LUNGS: Clear to auscultation bilaterally, no wheezes, rales, or rhonchi. No 

respiratory distress.


HEART: Regular rate and rhythm. No murmur


ABDOMEN: Soft, left lower quadrant pain noted, slight left pelvic pain noted.  

No right upper, lower abdominal pain, but no right pelvic pain noted, no 

suprapubic pain noted.  Non-distended. Bowel sounds present in all 4 quadrants.


EXTREMITIES: Moves all 4 extremities spontaneously. No edema, normal radial and 

dorsalis pedis pulses bilaterally. No cyanosis.


BACK: no cervical, thoracic, lumbar midline tenderness. No saddle anesthesia, 

normal distal neurovascular exam. 


NEUROLOGICAL: Alert and oriented x3. Normal speech. cranial nerves II through 

XII grossly intact 


PSYCH: Normal affect, normal mood.


SKIN: Warm, dry, normal turgor. No rashes or lesions noted.








Course





- Re-evaluation


Re-evalutation: 





09/24/19 04:39





Laboratory











  09/24/19 09/24/19 09/24/19





  01:53 01:53 02:15


 


WBC    8.1


 


RBC    4.44


 


Hgb    13.6


 


Hct    40.3


 


MCV    91


 


MCH    30.5


 


MCHC    33.7


 


RDW    13.3


 


Plt Count    342


 


Lymph % (Auto)    43.1


 


Mono % (Auto)    10.5


 


Eos % (Auto)    0.2


 


Baso % (Auto)    1.3


 


Absolute Neuts (auto)    3.7


 


Absolute Lymphs (auto)    3.5


 


Absolute Monos (auto)    0.8


 


Absolute Eos (auto)    0.0


 


Absolute Basos (auto)    0.1


 


Seg Neutrophils %    44.9


 


Sodium   


 


Potassium   


 


Chloride   


 


Carbon Dioxide   


 


Anion Gap   


 


BUN   


 


Creatinine   


 


Est GFR ( Amer)   


 


Est GFR (MDRD) Non-Af   


 


Glucose   


 


Calcium   


 


Total Bilirubin   


 


Direct Bilirubin   


 


Neonat Total Bilirubin   


 


Neonat Direct Bilirubin   


 


Neonat Indirect Bili   


 


AST   


 


ALT   


 


Alkaline Phosphatase   


 


Total Protein   


 


Albumin   


 


Beta HCG, Quant   


 


Total Beta HCG   


 


Urine Color  YELLOW  


 


Urine Appearance  SLIGHTLY-CLOUDY  


 


Urine pH  6.0  


 


Ur Specific Gravity  1.027  


 


Urine Protein  30 H  


 


Urine Glucose (UA)  NEGATIVE  


 


Urine Ketones  NEGATIVE  


 


Urine Blood  NEGATIVE  


 


Urine Nitrite  NEGATIVE  


 


Urine Bilirubin  NEGATIVE  


 


Urine Urobilinogen  NEGATIVE  


 


Ur Leukocyte Esterase  NEGATIVE  


 


Urine WBC (Auto)  2  


 


Urine RBC (Auto)  1  


 


Urine Bacteria (Auto)  TRACE  


 


Squamous Epi Cells Auto  4  


 


Urine Mucus (Auto)  MANY  


 


Urine Ascorbic Acid  NEGATIVE  


 


Urine HCG, Qual   POSITIVE H 














  09/24/19





  02:15


 


WBC 


 


RBC 


 


Hgb 


 


Hct 


 


MCV 


 


MCH 


 


MCHC 


 


RDW 


 


Plt Count 


 


Lymph % (Auto) 


 


Mono % (Auto) 


 


Eos % (Auto) 


 


Baso % (Auto) 


 


Absolute Neuts (auto) 


 


Absolute Lymphs (auto) 


 


Absolute Monos (auto) 


 


Absolute Eos (auto) 


 


Absolute Basos (auto) 


 


Seg Neutrophils % 


 


Sodium  139.0


 


Potassium  3.7


 


Chloride  105


 


Carbon Dioxide  23


 


Anion Gap  11


 


BUN  11


 


Creatinine  0.74


 


Est GFR ( Amer)  > 60


 


Est GFR (MDRD) Non-Af  > 60


 


Glucose  86


 


Calcium  9.1


 


Total Bilirubin  0.5


 


Direct Bilirubin  0.3


 


Neonat Total Bilirubin  Not Reportable


 


Neonat Direct Bilirubin  Not Reportable


 


Neonat Indirect Bili  Not Reportable


 


AST  37 H


 


ALT  39


 


Alkaline Phosphatase  70


 


Total Protein  7.9


 


Albumin  4.4


 


Beta HCG, Quant  956.17 H


 


Total Beta HCG  POSITIVE


 


Urine Color 


 


Urine Appearance 


 


Urine pH 


 


Ur Specific Gravity 


 


Urine Protein 


 


Urine Glucose (UA) 


 


Urine Ketones 


 


Urine Blood 


 


Urine Nitrite 


 


Urine Bilirubin 


 


Urine Urobilinogen 


 


Ur Leukocyte Esterase 


 


Urine WBC (Auto) 


 


Urine RBC (Auto) 


 


Urine Bacteria (Auto) 


 


Squamous Epi Cells Auto 


 


Urine Mucus (Auto) 


 


Urine Ascorbic Acid 


 


Urine HCG, Qual 











                                        





Transvaginal US  09/24/19 01:50


IMPRESSION:


 


No intrauterine pregnancy confirmed. Differential diagnosis


includes early viable intrauterine gestation, gestational loss,


and occult ECTOPIC gestation. Recommend 48 -72 hours


laboratory/sonographic surveillance.   


 








Ultrasound notes there is a hypoechoic component of the endometrial cavity which

may indicate a gestational sac, if viable mean sac diameter of 0.4 cm would 

correspond with gestational age of 5 weeks and 1 day.  No yolk sac.  No fetal 

pole.  No cardiac activity.





Patient's beta-hCG was 956.





I have reexamined the patient's abdomen.  She no longer has any pelvic pain.  

States her nausea has completely resolved.  States very slight left lower 

abdominal pain only upon deep palpation.





I have discussed with patient my recommendations for follow-up with OB/GYN in 

the next 48 to 72 hours for repeat laboratory of sonographic surveillance.  I 

have also discussed a clear liquid diet for the next 3 days for potential 

treatment of diverticulitis.  No indication of CT as patient has no signs of 

leukocytosis and is also pregnant.





At this time will discharge with return precautions and follow-up 

recommendations.  Verbal discharge instructions given a the bedside and 

opportunity for questions given. Medication warnings reviewed. Patient is in 

agreement with this plan and has verbalized understanding of return precautions 

and the need for primary care follow-up in the next 24-72 hours.





This medical record was dictated with voice recognizing software.  There may be 

grammatical, syntax errors that are unintended.





- Vital Signs


Vital signs: 





                                        











Temp Pulse Resp BP Pulse Ox


 


 98.2 F   85   18   128/77 H  96 


 


 09/24/19 04:03  09/24/19 04:03  09/24/19 04:03  09/24/19 04:03  09/24/19 04:03














- Laboratory


Result Diagrams: 


                                 09/24/19 02:15





                                 09/24/19 02:15


Laboratory results interpreted by me: 





                                        











  09/24/19 09/24/19 09/24/19





  01:53 01:53 02:15


 


AST    37 H


 


Beta HCG, Quant    956.17 H


 


Urine Protein  30 H  


 


Urine HCG, Qual   POSITIVE H 














Discharge





- Discharge


Clinical Impression: 


 Pelvic pain





Abdominal pain


Qualifiers:


 Abdominal location: left lower quadrant Qualified Code(s): R10.32 - Left lower 

quadrant pain





Pregnancy


Qualifiers:


 Weeks of gestation: less than 8 weeks Qualified Code(s): Z3A.01 - Less than 8 

weeks gestation of pregnancy





Condition: Stable


Disposition: HOME, SELF-CARE


Instructions:  Low-Fat Diet ([facility]), Diverticulitis ([facility]), Pregnancy ([facility])


Additional Instructions: 


As we discussed you have been seen and treated in the emergency department for 

your lower abdominal and pelvic pain.  You should call your OB/GYN to have 

repeat laboratory testing in the next 48 to 72 hours.  You should also attempt 

to partake in a clear liquid diet for the next 4 days.  This should help your 

left lower abdominal pain.  He can also try over-the-counter Colace for general

ized stool softener.  Please return to the emergency department for any further 

concerns.


Forms:  Parent Work Note

## 2019-09-24 NOTE — RADIOLOGY REPORT (SQ)
EXAM DESCRIPTION:

US PREGNANCY TRANSVAGINAL



COMPLETED DATE/TME:  09/24/2019 01:50



CLINICAL HISTORY:

24 years Female, + home preg test, left pelvic pain



COMPARISON:Aug 18 2019

TECHNIQUE: Transvaginal and transabdominal.



LIMITATIONS: Bowel gas. Body habitus.



FINDINGS:



No intrauterine gestation confirmed. There is a hypoechoic

component of the endometrial cavity which may indicate a

gestational sac. If  viable, mean sac diameter of 0.4-cm would

correspond with gestational age of five weeks and one day. No

yolk sac. No fetal pole. No cardiac activity.



Ovaries not visualized.



IMPRESSION:



No intrauterine pregnancy confirmed. Differential diagnosis

includes early viable intrauterine gestation, gestational loss,

and occult ECTOPIC gestation. Recommend 48 -72 hours

laboratory/sonographic surveillance.

## 2019-10-01 ENCOUNTER — HOSPITAL ENCOUNTER (EMERGENCY)
Dept: HOSPITAL 62 - ER | Age: 24
Discharge: HOME | End: 2019-10-01
Payer: COMMERCIAL

## 2019-10-01 VITALS — SYSTOLIC BLOOD PRESSURE: 136 MMHG | DIASTOLIC BLOOD PRESSURE: 69 MMHG
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
## 2019-10-01 DIAGNOSIS — O26.891: ICD-10-CM

## 2019-10-01 DIAGNOSIS — Z88.0: ICD-10-CM

## 2019-10-01 DIAGNOSIS — O99.89: ICD-10-CM

## 2019-10-01 DIAGNOSIS — R07.9: ICD-10-CM

## 2019-10-01 DIAGNOSIS — O9A.211: Primary | ICD-10-CM

## 2019-10-01 DIAGNOSIS — Z3A.01: ICD-10-CM

## 2019-10-01 DIAGNOSIS — M79.661: ICD-10-CM

## 2019-10-01 DIAGNOSIS — S39.91XA: ICD-10-CM

## 2019-10-01 DIAGNOSIS — R10.9: ICD-10-CM

## 2019-10-01 LAB
ADD MANUAL DIFF: NO
ALBUMIN SERPL-MCNC: 4.5 G/DL (ref 3.5–5)
ALP SERPL-CCNC: 64 U/L (ref 38–126)
ANION GAP SERPL CALC-SCNC: 11 MMOL/L (ref 5–19)
APPEARANCE UR: (no result)
APTT PPP: YELLOW S
AST SERPL-CCNC: 27 U/L (ref 14–36)
BASOPHILS # BLD AUTO: 0 10^3/UL (ref 0–0.2)
BASOPHILS NFR BLD AUTO: 0.3 % (ref 0–2)
BILIRUB DIRECT SERPL-MCNC: 0.2 MG/DL (ref 0–0.4)
BILIRUB SERPL-MCNC: 0.2 MG/DL (ref 0.2–1.3)
BILIRUB UR QL STRIP: NEGATIVE
BUN SERPL-MCNC: 13 MG/DL (ref 7–20)
CALCIUM: 9.3 MG/DL (ref 8.4–10.2)
CHLORIDE SERPL-SCNC: 106 MMOL/L (ref 98–107)
CO2 SERPL-SCNC: 21 MMOL/L (ref 22–30)
EOSINOPHIL # BLD AUTO: 0 10^3/UL (ref 0–0.6)
EOSINOPHIL NFR BLD AUTO: 0.1 % (ref 0–6)
ERYTHROCYTE [DISTWIDTH] IN BLOOD BY AUTOMATED COUNT: 13.4 % (ref 11.5–14)
GLUCOSE SERPL-MCNC: 94 MG/DL (ref 75–110)
GLUCOSE UR STRIP-MCNC: NEGATIVE MG/DL
HCT VFR BLD CALC: 40.3 % (ref 36–47)
HGB BLD-MCNC: 13.4 G/DL (ref 12–15.5)
KETONES UR STRIP-MCNC: NEGATIVE MG/DL
LYMPHOCYTES # BLD AUTO: 1.8 10^3/UL (ref 0.5–4.7)
LYMPHOCYTES NFR BLD AUTO: 23.7 % (ref 13–45)
MCH RBC QN AUTO: 30.1 PG (ref 27–33.4)
MCHC RBC AUTO-ENTMCNC: 33.1 G/DL (ref 32–36)
MCV RBC AUTO: 91 FL (ref 80–97)
MONOCYTES # BLD AUTO: 0.6 10^3/UL (ref 0.1–1.4)
MONOCYTES NFR BLD AUTO: 8 % (ref 3–13)
NEUTROPHILS # BLD AUTO: 5.2 10^3/UL (ref 1.7–8.2)
NEUTS SEG NFR BLD AUTO: 67.9 % (ref 42–78)
NITRITE UR QL STRIP: NEGATIVE
PH UR STRIP: 6 [PH] (ref 5–9)
PLATELET # BLD: 346 10^3/UL (ref 150–450)
POTASSIUM SERPL-SCNC: 4.1 MMOL/L (ref 3.6–5)
PROT SERPL-MCNC: 7.9 G/DL (ref 6.3–8.2)
PROT UR STRIP-MCNC: NEGATIVE MG/DL
RBC # BLD AUTO: 4.44 10^6/UL (ref 3.72–5.28)
SP GR UR STRIP: 1.03
TOTAL CELLS COUNTED % (AUTO): 100 %
UROBILINOGEN UR-MCNC: NEGATIVE MG/DL (ref ?–2)
WBC # BLD AUTO: 7.6 10^3/UL (ref 4–10.5)

## 2019-10-01 PROCEDURE — 81001 URINALYSIS AUTO W/SCOPE: CPT

## 2019-10-01 PROCEDURE — 85025 COMPLETE CBC W/AUTO DIFF WBC: CPT

## 2019-10-01 PROCEDURE — 80053 COMPREHEN METABOLIC PANEL: CPT

## 2019-10-01 PROCEDURE — 86901 BLOOD TYPING SEROLOGIC RH(D): CPT

## 2019-10-01 PROCEDURE — 36415 COLL VENOUS BLD VENIPUNCTURE: CPT

## 2019-10-01 PROCEDURE — 84702 CHORIONIC GONADOTROPIN TEST: CPT

## 2019-10-01 PROCEDURE — 86900 BLOOD TYPING SEROLOGIC ABO: CPT
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
## 2019-12-10 ENCOUNTER — HOSPITAL ENCOUNTER (EMERGENCY)
Dept: HOSPITAL 62 - ER | Age: 24
Discharge: HOME | End: 2019-12-10
Payer: COMMERCIAL

## 2019-12-10 VITALS — SYSTOLIC BLOOD PRESSURE: 147 MMHG | DIASTOLIC BLOOD PRESSURE: 89 MMHG

## 2019-12-10 DIAGNOSIS — O44.12: Primary | ICD-10-CM

## 2019-12-10 DIAGNOSIS — O26.892: ICD-10-CM

## 2019-12-10 DIAGNOSIS — Z3A.16: ICD-10-CM

## 2019-12-10 DIAGNOSIS — R10.2: ICD-10-CM

## 2019-12-10 LAB
ADD MANUAL DIFF: NO
ALBUMIN SERPL-MCNC: 3.4 G/DL (ref 3.5–5)
ALP SERPL-CCNC: 46 U/L (ref 38–126)
ANION GAP SERPL CALC-SCNC: 8 MMOL/L (ref 5–19)
APPEARANCE UR: (no result)
APTT PPP: YELLOW S
AST SERPL-CCNC: 15 U/L (ref 14–36)
BASOPHILS # BLD AUTO: 0.1 10^3/UL (ref 0–0.2)
BASOPHILS NFR BLD AUTO: 0.8 % (ref 0–2)
BILIRUB DIRECT SERPL-MCNC: 0.1 MG/DL (ref 0–0.4)
BILIRUB SERPL-MCNC: 0.2 MG/DL (ref 0.2–1.3)
BILIRUB UR QL STRIP: NEGATIVE
BUN SERPL-MCNC: 7 MG/DL (ref 7–20)
CALCIUM: 9.5 MG/DL (ref 8.4–10.2)
CHLORIDE SERPL-SCNC: 108 MMOL/L (ref 98–107)
CO2 SERPL-SCNC: 21 MMOL/L (ref 22–30)
EOSINOPHIL # BLD AUTO: 0 10^3/UL (ref 0–0.6)
EOSINOPHIL NFR BLD AUTO: 0.2 % (ref 0–6)
ERYTHROCYTE [DISTWIDTH] IN BLOOD BY AUTOMATED COUNT: 13.5 % (ref 11.5–14)
GLUCOSE SERPL-MCNC: 87 MG/DL (ref 75–110)
GLUCOSE UR STRIP-MCNC: NEGATIVE MG/DL
HCT VFR BLD CALC: 36.5 % (ref 36–47)
HGB BLD-MCNC: 12.6 G/DL (ref 12–15.5)
KETONES UR STRIP-MCNC: NEGATIVE MG/DL
LYMPHOCYTES # BLD AUTO: 3.9 10^3/UL (ref 0.5–4.7)
LYMPHOCYTES NFR BLD AUTO: 44.7 % (ref 13–45)
MCH RBC QN AUTO: 31.3 PG (ref 27–33.4)
MCHC RBC AUTO-ENTMCNC: 34.5 G/DL (ref 32–36)
MCV RBC AUTO: 91 FL (ref 80–97)
MONOCYTES # BLD AUTO: 0.8 10^3/UL (ref 0.1–1.4)
MONOCYTES NFR BLD AUTO: 9.5 % (ref 3–13)
NEUTROPHILS # BLD AUTO: 3.9 10^3/UL (ref 1.7–8.2)
NEUTS SEG NFR BLD AUTO: 44.8 % (ref 42–78)
NITRITE UR QL STRIP: NEGATIVE
PH UR STRIP: 6 [PH] (ref 5–9)
PLATELET # BLD: 302 10^3/UL (ref 150–450)
POTASSIUM SERPL-SCNC: 3.8 MMOL/L (ref 3.6–5)
PROT SERPL-MCNC: 6.5 G/DL (ref 6.3–8.2)
PROT UR STRIP-MCNC: NEGATIVE MG/DL
RBC # BLD AUTO: 4.02 10^6/UL (ref 3.72–5.28)
SP GR UR STRIP: 1.02
TOTAL CELLS COUNTED % (AUTO): 100 %
UROBILINOGEN UR-MCNC: NEGATIVE MG/DL (ref ?–2)
WBC # BLD AUTO: 8.6 10^3/UL (ref 4–10.5)

## 2019-12-10 PROCEDURE — 84702 CHORIONIC GONADOTROPIN TEST: CPT

## 2019-12-10 PROCEDURE — 80053 COMPREHEN METABOLIC PANEL: CPT

## 2019-12-10 PROCEDURE — 93976 VASCULAR STUDY: CPT

## 2019-12-10 PROCEDURE — 81001 URINALYSIS AUTO W/SCOPE: CPT

## 2019-12-10 PROCEDURE — 86900 BLOOD TYPING SEROLOGIC ABO: CPT

## 2019-12-10 PROCEDURE — 83690 ASSAY OF LIPASE: CPT

## 2019-12-10 PROCEDURE — 99284 EMERGENCY DEPT VISIT MOD MDM: CPT

## 2019-12-10 PROCEDURE — 36415 COLL VENOUS BLD VENIPUNCTURE: CPT

## 2019-12-10 PROCEDURE — 85025 COMPLETE CBC W/AUTO DIFF WBC: CPT

## 2019-12-10 PROCEDURE — 76805 OB US >/= 14 WKS SNGL FETUS: CPT

## 2019-12-10 PROCEDURE — 86901 BLOOD TYPING SEROLOGIC RH(D): CPT

## 2019-12-10 NOTE — RADIOLOGY REPORT (SQ)
EXAM:



Ultrasound pregnancy follow-up



CLINICAL DATA:



24-year-old female with 16 week pregnancy and vaginal bleeding.



TECHNICAL DATA:



Transabdominal ultrasound imaging was performed through the

gravid uterus. This study was performed on 12/10/2019 at 5:51 AM

 

COMPARISON:



Prior OB ultrasound performed on 9/24/2019.



FINDINGS:



ANATOMIC EVALUATION

FETUS         single

POSITION                variable

HEART RATE       149 bpm

AMNIOTIC FLUID     2.8 cm largest vertical pocket

PLACENTA LOCATION   posterior

PREVIA                questionable complete previa versus

marginal previa



MEASUREMENTS      

BPD:     3.29 cm corresponding to 16 weeks, 2 days. 

HC:     12.40 cm corresponding to 16 weeks, 2 days. 

AC:     10.79 cm corresponding to 16 weeks, 5 days.              

  

FL:     2.05 cm corresponding to 16 weeks, 1 days.               



CER:     2.3 cm         



FL/AC:        19.0     

FL/BPD:   62.3    

HC/AC:   1.15   

CI:     87.4    

EFW:          155  grams +/-   23 grams 



CLINICAL DATES

LMP     8/19/2019

[place]     16 weeks, 1 day.

EDC     5/25/2020



ESTIMATED DATES BY ULTRASOUND

[facility] [place]   16 weeks, 3 days 

EDC     5/23/2020





IMPRESSION:



1. Single living intrauterine pregnancy in variable presentation

with an estimated ultrasound age of 16 weeks, 3 days with an

estimated due date of 5/23/2020. This corresponds to within two

days of the expected clinical gestational age.

2. The placenta is posterior in location with evidence of either

complete or marginal previa.

3. The amniotic fluid index is grossly within normal limits at

this time

4. The cephalic index measures above the upper limits of normal.

The remainder of the fetal anatomic ratios are grossly within

normal range.

## 2019-12-10 NOTE — ER DOCUMENT REPORT
ED General





- General


Chief Complaint: Vaginal Bleeding


Stated Complaint: VAGINAL BLEEDING 16 WEEKS PREGNANT


Time Seen by Provider: 12/10/19 06:13


Primary Care Provider: 


NICK ANTHONY MD [Primary Care Provider] - Follow up as needed


TRAVEL OUTSIDE OF THE [place] IN LAST 30 DAYS: No





- HPI


Notes: 





Patient presents with vaginal bleeding.  She states that this started yesterday.

 She states she did have one episode of sharp vaginal pain that lasted a few 

seconds.  This pain did not radiate.  Nothing made it better or worse.  It 

occurred randomly and disappeared within a few seconds.  The bleeding was 

several episodes of some spotting.  No significant clots.  This is her first p

regnancy and she believes she is approximately 16 weeks pregnant.  She states 

she is had no complications to this point with the pregnancy.  She denies any 

type of trauma.  She states she has had some vomiting.  No diarrhea.





- Related Data


Allergies/Adverse Reactions: 


                                        





Penicillins Allergy (Verified 12/10/19 05:11)


   








Home Medications: PRE-NATALS,.  GABAPENTEN.  LEXAPRO.  FOLIC ACID.





Past Medical History





- General


Information source: Patient





- Social History


Smoking Status: Never Smoker


Frequency of alcohol use: None


Drug Abuse: None


Family History: None


Patient has suicidal ideation: No


Patient has homicidal ideation: No


Neurological Medical History: Reports: Hx Seizures


Renal/ Medical History: Denies: Hx Peritoneal Dialysis





Review of Systems





- Review of Systems


Constitutional: denies: Chills, Fever


Cardiovascular: denies: Chest pain, Palpitations


Respiratory: denies: Cough, Short of breath


Gastrointestinal: Vomiting.  denies: Abdominal pain


-: Yes All other systems reviewed and negative





Physical Exam





- Vital signs


Vitals: 





                                        











Temp Pulse Resp BP


 


 98.1 F   86   16   147/89 H


 


 12/10/19 05:06  12/10/19 05:06  12/10/19 05:06  12/10/19 05:06











Interpretation: Normal





- General


General appearance: Appears well, Alert





- HEENT


Head: Normocephalic, Atraumatic


Eyes: Normal


Pupils: PERRL





- Respiratory


Respiratory status: No respiratory distress


Chest status: Nontender


Breath sounds: Normal


Chest palpation: Normal





- Cardiovascular


Rhythm: Regular


Heart sounds: Normal auscultation


Murmur: No





- Abdominal


Inspection: Normal


Distension: No distension


Bowel sounds: Normal


Tenderness: Nontender


Organomegaly: No organomegaly





- Back


Back: Normal, Nontender





- Extremities


General upper extremity: Normal inspection, Nontender, Normal color, Normal ROM,

Normal temperature


General lower extremity: Normal inspection, Nontender, Normal color, Normal ROM,

Normal temperature, Normal weight bearing.  No: Kisha's sign





- Neurological


Neuro grossly intact: Yes


Cognition: Normal


Orientation: AAOx4


[place] Coma Scale Eye Opening: Spontaneous


[place] Coma Scale Verbal: Oriented


[place] Coma Scale Motor: Obeys Commands


[place] Coma Scale Total: 15


Speech: Normal


Motor strength normal: LUE, RUE, LLE, RLE


Sensory: Normal





- Psychological


Associated symptoms: Normal affect, Normal mood





- Skin


Skin Temperature: Warm


Skin Moisture: Dry


Skin Color: Normal





Course





- Re-evaluation


Re-evalutation: 





12/10/19 07:16


Patient presents with vaginal bleeding.  She is 16 weeks pregnant.  She does not

require RhoGam.  Ultrasound shows a partial to complete previa.  I discussed the

case with the obstetrician, Dr. Bhakta.  She states the patient can be 

discharged home and follow-up in the clinic.  She asked that the patient be 

placed on modified bedrest with no intercourse.  Vital signs and labs are 

otherwise stable.





- Vital Signs


Vital signs: 





                                        











Temp Pulse Resp BP Pulse Ox


 


 98.1 F   86   16   147/89 H   


 


 12/10/19 05:06  12/10/19 05:06  12/10/19 05:06  12/10/19 05:06   














- Laboratory


Result Diagrams: 


                                 12/10/19 05:38





                                 12/10/19 05:38


Laboratory results interpreted by me: 





                                        











  12/10/19 12/10/19





  05:38 06:35


 


Chloride  108 H 


 


Carbon Dioxide  21 L 


 


Albumin  3.4 L 


 


Beta HCG, Quant  99783.00 H 


 


Urine Blood   LARGE H


 


Ur Leukocyte Esterase   TRACE H














- Diagnostic Test


Radiology reviewed: Image reviewed, Reports reviewed





Discharge





- Discharge


Clinical Impression: 


 Placenta previa antepartum in second trimester





Condition: Stable


Disposition: HOME, SELF-CARE


Instructions:  Vaginal Bleeding ([facility])


Additional Instructions: 


For the next week please have bed rest as much as possible.  Do not have 

intercourse or have anything inserted in your vaginal canal until you have seen 

your obstetrician.  Please see your obstetrician as soon as possible.  Please 

call today to arrange for an appointment.


Forms:  Return to Work


Referrals: 


NICK ANTHONY MD [Primary Care Provider] - Follow up tomorrow

## 2020-02-06 ENCOUNTER — HOSPITAL ENCOUNTER (OUTPATIENT)
Dept: HOSPITAL 62 - LC | Age: 25
Discharge: HOME | End: 2020-02-06
Attending: OBSTETRICS & GYNECOLOGY
Payer: COMMERCIAL

## 2020-02-06 DIAGNOSIS — R51: ICD-10-CM

## 2020-02-06 DIAGNOSIS — O47.02: Primary | ICD-10-CM

## 2020-02-06 DIAGNOSIS — Z3A.24: ICD-10-CM

## 2020-02-06 DIAGNOSIS — O26.892: ICD-10-CM

## 2020-02-06 LAB
ADD MANUAL DIFF: NO
ALBUMIN SERPL-MCNC: 3.5 G/DL (ref 3.5–5)
ALP SERPL-CCNC: 50 U/L (ref 38–126)
ANION GAP SERPL CALC-SCNC: 9 MMOL/L (ref 5–19)
APPEARANCE UR: (no result)
APTT PPP: YELLOW S
AST SERPL-CCNC: 17 U/L (ref 14–36)
BARBITURATES UR QL SCN: NEGATIVE
BASOPHILS # BLD AUTO: 0 10^3/UL (ref 0–0.2)
BASOPHILS NFR BLD AUTO: 0.2 % (ref 0–2)
BILIRUB DIRECT SERPL-MCNC: 0 MG/DL (ref 0–0.4)
BILIRUB SERPL-MCNC: 0.2 MG/DL (ref 0.2–1.3)
BILIRUB UR QL STRIP: NEGATIVE
BUN SERPL-MCNC: 9 MG/DL (ref 7–20)
CALCIUM: 8.9 MG/DL (ref 8.4–10.2)
CHLORIDE SERPL-SCNC: 106 MMOL/L (ref 98–107)
CO2 SERPL-SCNC: 20 MMOL/L (ref 22–30)
CREAT UR-MCNC: 170.5 MG/DL (ref 16–327)
EOSINOPHIL # BLD AUTO: 0 10^3/UL (ref 0–0.6)
EOSINOPHIL NFR BLD AUTO: 0.1 % (ref 0–6)
ERYTHROCYTE [DISTWIDTH] IN BLOOD BY AUTOMATED COUNT: 13.8 % (ref 11.5–14)
GLUCOSE SERPL-MCNC: 71 MG/DL (ref 75–110)
GLUCOSE UR STRIP-MCNC: NEGATIVE MG/DL
HCT VFR BLD CALC: 34.5 % (ref 36–47)
HGB BLD-MCNC: 11.8 G/DL (ref 12–15.5)
KETONES UR STRIP-MCNC: NEGATIVE MG/DL
LYMPHOCYTES # BLD AUTO: 2.5 10^3/UL (ref 0.5–4.7)
LYMPHOCYTES NFR BLD AUTO: 24.3 % (ref 13–45)
MCH RBC QN AUTO: 30.9 PG (ref 27–33.4)
MCHC RBC AUTO-ENTMCNC: 34.1 G/DL (ref 32–36)
MCV RBC AUTO: 91 FL (ref 80–97)
METHADONE UR QL SCN: NEGATIVE
MONOCYTES # BLD AUTO: 1 10^3/UL (ref 0.1–1.4)
MONOCYTES NFR BLD AUTO: 9.5 % (ref 3–13)
NEUTROPHILS # BLD AUTO: 6.9 10^3/UL (ref 1.7–8.2)
NEUTS SEG NFR BLD AUTO: 65.9 % (ref 42–78)
NITRITE UR QL STRIP: NEGATIVE
PCP UR QL SCN: NEGATIVE
PH UR STRIP: 5 [PH] (ref 5–9)
PLATELET # BLD: 288 10^3/UL (ref 150–450)
POTASSIUM SERPL-SCNC: 4.1 MMOL/L (ref 3.6–5)
PROT SERPL-MCNC: 6.8 G/DL (ref 6.3–8.2)
PROT UR STRIP-MCNC: 8.8 MG/DL (ref ?–12)
PROT UR STRIP-MCNC: NEGATIVE MG/DL
RBC # BLD AUTO: 3.81 10^6/UL (ref 3.72–5.28)
SP GR UR STRIP: 1.02
TOTAL CELLS COUNTED % (AUTO): 100 %
UR PRO/CREAT RATIO RESULT: 0.1 MG/MG (ref 0–0.2)
URATE SERPL-MCNC: 4.1 MG/DL (ref 2.5–6.2)
URINE AMPHETAMINES SCREEN: NEGATIVE
URINE BENZODIAZEPINES SCREEN: NEGATIVE
URINE COCAINE SCREEN: NEGATIVE
URINE MARIJUANA (THC) SCREEN: NEGATIVE
UROBILINOGEN UR-MCNC: NEGATIVE MG/DL (ref ?–2)
WBC # BLD AUTO: 10.4 10^3/UL (ref 4–10.5)

## 2020-02-06 PROCEDURE — 4A1HXCZ MONITORING OF PRODUCTS OF CONCEPTION, CARDIAC RATE, EXTERNAL APPROACH: ICD-10-PCS | Performed by: OBSTETRICS & GYNECOLOGY

## 2020-02-06 PROCEDURE — 82570 ASSAY OF URINE CREATININE: CPT

## 2020-02-06 PROCEDURE — 80053 COMPREHEN METABOLIC PANEL: CPT

## 2020-02-06 PROCEDURE — 80307 DRUG TEST PRSMV CHEM ANLYZR: CPT

## 2020-02-06 PROCEDURE — 84550 ASSAY OF BLOOD/URIC ACID: CPT

## 2020-02-06 PROCEDURE — 84156 ASSAY OF PROTEIN URINE: CPT

## 2020-02-06 PROCEDURE — 36415 COLL VENOUS BLD VENIPUNCTURE: CPT

## 2020-02-06 PROCEDURE — 81001 URINALYSIS AUTO W/SCOPE: CPT

## 2020-02-06 PROCEDURE — 85025 COMPLETE CBC W/AUTO DIFF WBC: CPT

## 2020-02-06 PROCEDURE — 83615 LACTATE (LD) (LDH) ENZYME: CPT

## 2020-02-25 ENCOUNTER — HOSPITAL ENCOUNTER (OUTPATIENT)
Dept: HOSPITAL 62 - LC | Age: 25
Discharge: HOME | End: 2020-02-25
Attending: OBSTETRICS & GYNECOLOGY
Payer: COMMERCIAL

## 2020-02-25 DIAGNOSIS — R10.9: ICD-10-CM

## 2020-02-25 DIAGNOSIS — O26.892: Primary | ICD-10-CM

## 2020-02-25 DIAGNOSIS — Z3A.27: ICD-10-CM

## 2020-02-25 LAB
APPEARANCE UR: CLEAR
APTT PPP: YELLOW S
BACTERIA (WET MOUNT): (no result)
BARBITURATES UR QL SCN: NEGATIVE
BILIRUB UR QL STRIP: NEGATIVE
CHLAM PCR: NOT DETECTED
EPITHELIALS (WET MOUNT): (no result)
GLUCOSE UR STRIP-MCNC: NEGATIVE MG/DL
KETONES UR STRIP-MCNC: NEGATIVE MG/DL
METHADONE UR QL SCN: NEGATIVE
NITRITE UR QL STRIP: NEGATIVE
PCP UR QL SCN: NEGATIVE
PH UR STRIP: 7 [PH] (ref 5–9)
PROT UR STRIP-MCNC: NEGATIVE MG/DL
SP GR UR STRIP: 1.01
T.VAGINALIS (WET MOUNT): (no result)
URINE AMPHETAMINES SCREEN: NEGATIVE
URINE BENZODIAZEPINES SCREEN: NEGATIVE
URINE COCAINE SCREEN: NEGATIVE
URINE MARIJUANA (THC) SCREEN: NEGATIVE
UROBILINOGEN UR-MCNC: NEGATIVE MG/DL (ref ?–2)
WBCS (WET MOUNT): (no result)
YEAST (WET MOUNT): (no result)

## 2020-02-25 PROCEDURE — 80307 DRUG TEST PRSMV CHEM ANLYZR: CPT

## 2020-02-25 PROCEDURE — 87591 N.GONORRHOEAE DNA AMP PROB: CPT

## 2020-02-25 PROCEDURE — 87491 CHLMYD TRACH DNA AMP PROBE: CPT

## 2020-02-25 PROCEDURE — 81001 URINALYSIS AUTO W/SCOPE: CPT

## 2020-02-25 PROCEDURE — 87210 SMEAR WET MOUNT SALINE/INK: CPT

## 2020-02-25 PROCEDURE — 76815 OB US LIMITED FETUS(S): CPT

## 2020-02-25 NOTE — RADIOLOGY REPORT (SQ)
EXAM DESCRIPTION: 



US PREGNANCY LIMITED  



COMPLETED DATE/TME:  02/25/2020 00:00



CLINICAL HISTORY: 



25 years, Female, CL to R/O PTL



COMPARISON:

Multiple priors, most recent from 12/10/2019



TECHNIQUE:

Axial 2-D grayscale images of the pelvis were acquired. Doppler

was utilized.



LIMITATIONS:

None.



FINDINGS:



Single intrauterine pregnancy is identified with measurements as

follows:

Biparietal diameter: 6.7 cm

At circumference: 25.0 cm

Abdominal circumference: 22.72 cm

Femoral length: 5.06 cm

Estimated fetal weight is 1032 g for an estimated gestational age

of 27 weeks and one day. Estimated date of confinement is

5/25/2020.

Amniotic fluid index is 9 cm

Largest vertical pocket is 5.9 cm

Fetal heart rate is 137 bpm

Cervix is closed, measuring 3 cm in length.

Placenta is posterior, grade 2

Fetus is in the vertex presentation



IMPRESSION:



Single live intrauterine pregnancy, as above described. No acute

findings.

 



copyright 2011 [employer]- All Rights Reserved